# Patient Record
Sex: MALE | URBAN - METROPOLITAN AREA
[De-identification: names, ages, dates, MRNs, and addresses within clinical notes are randomized per-mention and may not be internally consistent; named-entity substitution may affect disease eponyms.]

---

## 2023-02-22 ENCOUNTER — OFFICE VISIT (OUTPATIENT)
Dept: URBAN - METROPOLITAN AREA CLINIC 27 | Facility: CLINIC | Age: 53
End: 2023-02-22
Payer: COMMERCIAL

## 2023-02-22 ENCOUNTER — DASHBOARD ENCOUNTERS (OUTPATIENT)
Age: 53
End: 2023-02-22

## 2023-02-22 ENCOUNTER — LAB OUTSIDE AN ENCOUNTER (OUTPATIENT)
Dept: URBAN - METROPOLITAN AREA CLINIC 27 | Facility: CLINIC | Age: 53
End: 2023-02-22

## 2023-02-22 VITALS
HEIGHT: 73 IN | BODY MASS INDEX: 35.92 KG/M2 | WEIGHT: 271 LBS | DIASTOLIC BLOOD PRESSURE: 88 MMHG | HEART RATE: 92 BPM | SYSTOLIC BLOOD PRESSURE: 136 MMHG

## 2023-02-22 DIAGNOSIS — K21.9 GERD WITHOUT ESOPHAGITIS: ICD-10-CM

## 2023-02-22 DIAGNOSIS — Z12.11 SCREEN FOR COLON CANCER: ICD-10-CM

## 2023-02-22 PROBLEM — 266435005: Status: ACTIVE | Noted: 2023-02-22

## 2023-02-22 PROCEDURE — 99203 OFFICE O/P NEW LOW 30 MIN: CPT | Performed by: INTERNAL MEDICINE

## 2023-02-22 NOTE — HPI-TODAY'S VISIT:
52-year-old male here for screening colonoscopy.  He did have a colonoscopy about 20 years ago, cannot recall why he had the colonoscopy, but states it was incomplete due to looping.  Currently denies abdominal pain, change in bowel habits, rectal bleeding, weight loss.  He does have reflux disease, mostly at night, mostly controlled with Pepcid.  Denies dysphagia, melena, weight loss.

## 2023-03-13 ENCOUNTER — CLAIMS CREATED FROM THE CLAIM WINDOW (OUTPATIENT)
Dept: URBAN - METROPOLITAN AREA SURGERY CENTER 7 | Facility: SURGERY CENTER | Age: 53
End: 2023-03-13
Payer: COMMERCIAL

## 2023-03-13 DIAGNOSIS — D12.0 ADENOMA OF CECUM: ICD-10-CM

## 2023-03-13 DIAGNOSIS — D12.3 ADENOMA OF TRANSVERSE COLON: ICD-10-CM

## 2023-03-13 DIAGNOSIS — Z12.11 COLON CANCER SCREENING: ICD-10-CM

## 2023-03-13 PROCEDURE — 45385 COLONOSCOPY W/LESION REMOVAL: CPT | Performed by: INTERNAL MEDICINE

## 2023-03-13 PROCEDURE — G8907 PT DOC NO EVENTS ON DISCHARG: HCPCS | Performed by: INTERNAL MEDICINE

## 2023-03-27 ENCOUNTER — WEB ENCOUNTER (OUTPATIENT)
Dept: URBAN - METROPOLITAN AREA CLINIC 27 | Facility: CLINIC | Age: 53
End: 2023-03-27